# Patient Record
Sex: FEMALE | Race: BLACK OR AFRICAN AMERICAN | NOT HISPANIC OR LATINO | URBAN - METROPOLITAN AREA
[De-identification: names, ages, dates, MRNs, and addresses within clinical notes are randomized per-mention and may not be internally consistent; named-entity substitution may affect disease eponyms.]

---

## 2018-03-14 ENCOUNTER — EMERGENCY (EMERGENCY)
Facility: HOSPITAL | Age: 43
LOS: 0 days | Discharge: HOME | End: 2018-03-14
Attending: STUDENT IN AN ORGANIZED HEALTH CARE EDUCATION/TRAINING PROGRAM
Payer: COMMERCIAL

## 2018-03-14 VITALS
HEART RATE: 73 BPM | WEIGHT: 145.95 LBS | SYSTOLIC BLOOD PRESSURE: 119 MMHG | OXYGEN SATURATION: 99 % | RESPIRATION RATE: 16 BRPM | DIASTOLIC BLOOD PRESSURE: 85 MMHG | TEMPERATURE: 99 F | HEIGHT: 67 IN

## 2018-03-14 DIAGNOSIS — L03.115 CELLULITIS OF RIGHT LOWER LIMB: ICD-10-CM

## 2018-03-14 DIAGNOSIS — Z91.040 LATEX ALLERGY STATUS: ICD-10-CM

## 2018-03-14 DIAGNOSIS — L29.9 PRURITUS, UNSPECIFIED: ICD-10-CM

## 2018-03-14 PROCEDURE — 99283 EMERGENCY DEPT VISIT LOW MDM: CPT

## 2018-03-14 RX ORDER — IBUPROFEN 200 MG
1 TABLET ORAL
Qty: 20 | Refills: 0 | OUTPATIENT
Start: 2018-03-14

## 2018-03-14 RX ORDER — IBUPROFEN 200 MG
600 TABLET ORAL ONCE
Qty: 0 | Refills: 0 | Status: COMPLETED | OUTPATIENT
Start: 2018-03-14 | End: 2018-03-14

## 2018-03-14 RX ADMIN — Medication 600 MILLIGRAM(S): at 20:01

## 2018-03-14 RX ADMIN — Medication 600 MILLIGRAM(S): at 19:38

## 2018-03-14 RX ADMIN — Medication 450 MILLIGRAM(S): at 19:38

## 2018-03-14 NOTE — ED PROVIDER NOTE - MEDICAL DECISION MAKING DETAILS
43 y/o female here with localized cellulitis bug bite R thigh. no red streaks. no fevers. VSS, tetanus utd. will send home with pain meds, abx, outpt f/u, counselled re f/u needs and return preacutions

## 2018-03-14 NOTE — ED PROVIDER NOTE - ATTENDING CONTRIBUTION TO CARE
I have personally performed a face to face diagnostic evaluation on this patient.  I have reviewed the PA note and agree with the history, exam and plan of care    42 year old female presented today c/o getting bitten by an insect while in [place], pt states that she was sitting on a bench outdoors at the time, +pruritis +warm to touch +erythema (-) fevers or chills, on exam the area is 3x4cm, is warm to touch, clindamycin started, outpt follow up with his pmd

## 2018-03-14 NOTE — ED ADULT TRIAGE NOTE - CHIEF COMPLAINT QUOTE
right upper leg red swollen and hard to touch, believes it may be a bug bite that occurred in [place]

## 2018-03-14 NOTE — ED ADULT NURSE NOTE - OBJECTIVE STATEMENT
Pt states she got bit by an insect last night around 10PM.  Upper right posterior thigh has some swelling, redness and warmth to area.  Pain is primarily when she puts pressure on it.  No discharge, but c/o of right leg numbness.

## 2018-03-14 NOTE — ED ADULT NURSE NOTE - NS ED NURSE DC INFO COMPLEXITY
Moderate: Comprehensive teaching/Verbalized Understanding/Returned Demonstration/Patient asked questions

## 2018-03-14 NOTE — ED PROVIDER NOTE - OBJECTIVE STATEMENT
43 y/o female with no PMH here c/o bug bite to R thigh x 2 days. pt states she arrived from [place] this am, and thinks she got a bug bite while sitting on a bench in someone's house there. unsure what bug. she noticed it was red and warm so came to ed for eval. utd with tetanus. no fevers. pt has no other complaints. denies fever, chills, dizziness, n/v/, cp, HA, change in sensation    ROS: No fever/chills. No eye pain/changes in vision, No ear pain/sore throat/dysphagia, No chest pain/palpitations. No SOB/cough/. No abdominal pain, N/V/D, no black/bloody bm. No dysuria/frequency/discharge, No headache. No Dizziness.   +bug bite  R thigh  No numbness/tingling/weakness.

## 2018-03-14 NOTE — ED PROVIDER NOTE - PHYSICAL EXAMINATION
Gen: Alert, NAD, not toxic  Head: NC, AT, PERRL, EOMI, normal lids/conjunctiva  ENT: B TM WNL, normal hearing, patent oropharynx without erythema/exudate, uvula midline  Neck: +supple, no tenderness/meningismus/JVD, +Trachea midline  Pulm: Bilateral BS, normal resp effort, no wheeze/stridor/retractions  CV: RRR, no M/R/G, +dist pulses  Abd: soft, NT/ND, +BS, no hepatosplenomegaly  Mskel: no cyanosis, str 5/5 x4, NV intact  Skin: circular area post R thigh of erythema, warmth, and indurated papule  Neuro: AAOx3, no sensory/motor deficits, CN 2-12 intact, gait ok

## 2018-03-14 NOTE — ED ADULT NURSE NOTE - CHIEF COMPLAINT QUOTE
right upper leg red swollen and hard to touch, believes it may be a bug bite that occurred in [place]